# Patient Record
Sex: FEMALE | Race: WHITE | NOT HISPANIC OR LATINO | Employment: FULL TIME | URBAN - METROPOLITAN AREA
[De-identification: names, ages, dates, MRNs, and addresses within clinical notes are randomized per-mention and may not be internally consistent; named-entity substitution may affect disease eponyms.]

---

## 2022-10-22 ENCOUNTER — APPOINTMENT (EMERGENCY)
Dept: RADIOLOGY | Facility: HOSPITAL | Age: 62
End: 2022-10-22
Payer: COMMERCIAL

## 2022-10-22 ENCOUNTER — HOSPITAL ENCOUNTER (EMERGENCY)
Facility: HOSPITAL | Age: 62
Discharge: HOME | End: 2022-10-22
Attending: EMERGENCY MEDICINE
Payer: COMMERCIAL

## 2022-10-22 VITALS
OXYGEN SATURATION: 99 % | BODY MASS INDEX: 30.48 KG/M2 | SYSTOLIC BLOOD PRESSURE: 139 MMHG | WEIGHT: 172 LBS | HEIGHT: 63 IN | TEMPERATURE: 98.5 F | DIASTOLIC BLOOD PRESSURE: 92 MMHG | HEART RATE: 79 BPM | RESPIRATION RATE: 16 BRPM

## 2022-10-22 DIAGNOSIS — S92.909A FOOT FRACTURE: Primary | ICD-10-CM

## 2022-10-22 PROCEDURE — 73620 X-RAY EXAM OF FOOT: CPT

## 2022-10-22 PROCEDURE — 99284 EMERGENCY DEPT VISIT MOD MDM: CPT | Performed by: EMERGENCY MEDICINE

## 2022-10-22 PROCEDURE — 97760 ORTHOTIC MGMT&TRAING 1ST ENC: CPT

## 2022-10-22 RX ORDER — IBUPROFEN 600 MG/1
600 TABLET ORAL ONCE
Status: COMPLETED | OUTPATIENT
Start: 2022-10-22 | End: 2022-10-22

## 2022-10-22 RX ADMIN — IBUPROFEN 600 MG: 600 TABLET ORAL at 09:58

## 2022-10-22 NOTE — ED PROVIDER NOTES
History  Chief Complaint   Patient presents with   • Foot Injury     Patient co right foot pain that started Wednesday  Patient reports "unable to bear weight " Denies injuries  Patient is a 76-year-old female past medical history of hypertension, gout presenting right foot pain  Patient states the past 3 days she has had intermittent right foot pain which is worse with weight-bearing and is more to the dorsum of the foot and feels like a sharp pain pushing down into the foot  She denies any injuries and states that it started mildly and worsened yesterday  States it does not feel like her prior gout which she normally gets to the 1st MCP on the left side  Was unable to get a hold of her podiatrist   Has been taking acetaminophen every 8 hours as well as icing and elevating the foot with minimal relief  Denies any numbness or tingling or fevers  She denies any injuries that she is aware of  She states that it felt weak and stiff today  She does state that she is very physical job is on her feet often  None       Past Medical History:   Diagnosis Date   • Gout    • Hypertension        History reviewed  No pertinent surgical history  History reviewed  No pertinent family history  I have reviewed and agree with the history as documented  E-Cigarette/Vaping   • E-Cigarette Use Never User      E-Cigarette/Vaping Substances     Social History     Tobacco Use   • Smoking status: Never Smoker   • Smokeless tobacco: Never Used   Vaping Use   • Vaping Use: Never used   Substance Use Topics   • Drug use: Not Currently       Review of Systems   All other systems reviewed and are negative  Physical Exam  Physical Exam  Vitals reviewed  Constitutional:       General: She is not in acute distress  Appearance: Normal appearance  She is not ill-appearing     HENT:      Mouth/Throat:      Mouth: Mucous membranes are moist    Eyes:      Conjunctiva/sclera: Conjunctivae normal    Cardiovascular: Rate and Rhythm: Normal rate  Pulses: Normal pulses  Pulmonary:      Effort: Pulmonary effort is normal    Musculoskeletal:         General: Swelling and tenderness present  Normal range of motion  Cervical back: Neck supple  Comments: Patient has mild to moderate edema to the foot with intact pulses, intact sensation, intact motor, mild tenderness to the proximal aspect of the dorsum of the foot roughly 1 cm distal to the ankle, no tenderness to the ankle, no tenderness to the leg or lower leg   Skin:     General: Skin is warm and dry  Capillary Refill: Capillary refill takes less than 2 seconds  Neurological:      General: No focal deficit present  Mental Status: She is alert  Sensory: No sensory deficit  Motor: No weakness  Psychiatric:         Mood and Affect: Mood normal          Vital Signs  ED Triage Vitals [10/22/22 0903]   Temperature Pulse Respirations Blood Pressure SpO2   98 5 °F (36 9 °C) 79 16 139/92 99 %      Temp Source Heart Rate Source Patient Position - Orthostatic VS BP Location FiO2 (%)   Oral Monitor Sitting Left arm --      Pain Score       8           Vitals:    10/22/22 0903   BP: 139/92   Pulse: 79   Patient Position - Orthostatic VS: Sitting         Visual Acuity      ED Medications  Medications   ibuprofen (MOTRIN) tablet 600 mg (600 mg Oral Given 10/22/22 2039)       Diagnostic Studies  Results Reviewed     None                 XR foot 2 views RIGHT   ED Interpretation by Bethany Aguilar DO (10/22 1043)   Possible nondisplaced fracture at the proximal 3rd metatarsal                 Procedures  Procedures         ED Course  ED Course as of 10/22/22 1719   Sat Oct 22, 2022   1045 Patient with possible nondisplaced fracture at they proximal 3rd metatarsal, will place in boot with cane an outpatient podiatry follow-up                                               MDM  Number of Diagnoses or Management Options  Diagnosis management comments: Patient is 79-year-old female past medical history of hypertension, gout presenting with left foot injury  Patient is well-appearing bedside stable vitals and in no acute distress  She has mild to moderate edema to the foot with mildly increased warmth but with intact range of motion, mild tenderness to the dorsum of the foot roughly 1 cm distal to the ankle, no skin changes to the foot, no tenderness to the leg  Will obtain x-ray to rule out fracture/ assess for foreign body which I feel is less likely given location, give pain control and have discussed NSAID pain relievers at home to treat possible gout, plantar fasciitis, other inflammatory conditions of the foot though I do not have any concern for infection/septic joint at this time  Have encouraged podiatry follow-up if no injuries found  Disposition  Final diagnoses: Foot fracture     Time reflects when diagnosis was documented in both MDM as applicable and the Disposition within this note     Time User Action Codes Description Comment    10/22/2022 10:44 AM Jennifer Blakely Add [O04 [id number]] Foot fracture       ED Disposition     ED Disposition   Discharge    Condition   Stable    Date/Time   Sat Oct 22, 2022 10:44 AM    Comment   Itzel Galo discharge to home/self care  Follow-up Information     Follow up With Specialties Details Why Contact Info    [facility] Schedule an appointment as soon as possible for a visit   [address] [facility] [address]            There are no discharge medications for this patient  No discharge procedures on file      PDMP Review     None          ED Provider  Electronically Signed by           Tanya De Jesus DO  10/22/22 8001